# Patient Record
Sex: FEMALE | Race: WHITE
[De-identification: names, ages, dates, MRNs, and addresses within clinical notes are randomized per-mention and may not be internally consistent; named-entity substitution may affect disease eponyms.]

---

## 2019-04-04 ENCOUNTER — HOSPITAL ENCOUNTER (EMERGENCY)
Dept: HOSPITAL 10 - E/R | Age: 54
Discharge: HOME | End: 2019-04-04
Payer: MEDICAID

## 2019-04-04 ENCOUNTER — HOSPITAL ENCOUNTER (EMERGENCY)
Dept: HOSPITAL 91 - E/R | Age: 54
Discharge: HOME | End: 2019-04-04
Payer: MEDICAID

## 2019-04-04 VITALS
BODY MASS INDEX: 31.64 KG/M2 | WEIGHT: 178.57 LBS | HEIGHT: 63 IN | WEIGHT: 178.57 LBS | HEIGHT: 63 IN | BODY MASS INDEX: 31.64 KG/M2

## 2019-04-04 VITALS — RESPIRATION RATE: 18 BRPM | SYSTOLIC BLOOD PRESSURE: 155 MMHG | DIASTOLIC BLOOD PRESSURE: 79 MMHG | HEART RATE: 70 BPM

## 2019-04-04 DIAGNOSIS — R51: Primary | ICD-10-CM

## 2019-04-04 DIAGNOSIS — R11.2: ICD-10-CM

## 2019-04-04 DIAGNOSIS — E11.9: ICD-10-CM

## 2019-04-04 DIAGNOSIS — J45.909: ICD-10-CM

## 2019-04-04 PROCEDURE — 36415 COLL VENOUS BLD VENIPUNCTURE: CPT

## 2019-04-04 PROCEDURE — 96375 TX/PRO/DX INJ NEW DRUG ADDON: CPT

## 2019-04-04 PROCEDURE — 99285 EMERGENCY DEPT VISIT HI MDM: CPT

## 2019-04-04 PROCEDURE — 70450 CT HEAD/BRAIN W/O DYE: CPT

## 2019-04-04 PROCEDURE — 96374 THER/PROPH/DIAG INJ IV PUSH: CPT

## 2019-04-04 PROCEDURE — 82962 GLUCOSE BLOOD TEST: CPT

## 2019-04-04 RX ADMIN — THIAMINE HYDROCHLORIDE 1 MLS/HR: 100 INJECTION, SOLUTION INTRAMUSCULAR; INTRAVENOUS at 17:18

## 2019-04-04 RX ADMIN — DIPHENHYDRAMINE HYDROCHLORIDE 1 MG: 50 INJECTION, SOLUTION INTRAMUSCULAR; INTRAVENOUS at 17:18

## 2019-04-04 RX ADMIN — ONDANSETRON HYDROCHLORIDE 1 MG: 2 INJECTION, SOLUTION INTRAMUSCULAR; INTRAVENOUS at 17:18

## 2019-04-04 RX ADMIN — KETOROLAC TROMETHAMINE 1 MG: 30 INJECTION, SOLUTION INTRAMUSCULAR at 17:18

## 2019-04-04 RX ADMIN — METOCLOPRAMIDE HYDROCHLORIDE 1 MG: 10 INJECTION, SOLUTION INTRAMUSCULAR; INTRAVENOUS at 17:18

## 2019-04-04 NOTE — ERD
ER Documentation


Chief Complaint


Chief Complaint





Migraine HA 10/10, N/V X 5 days.





HPI


Patient is a 53-year-old female with diabetes and migraine who presents with 


headache.  The symptoms of the headache started 3 weeks ago and the patient has 


a history of migraine headaches.  Over the past 5 days however it has been 


worse.  It was gradual in onset.  The patient has had decreased intake by mouth 


and vomiting.  The patient has had no treatment as of yet.  The patient has 


light sensitivity.  Upon review of old medical records this is the patient's 


first visit to the ER.  The primary doctor is Dr. Pierson.





ROS


All systems reviewed and are negative except as per history of present illness.





Medications


Home Meds


Active Scripts


Ibuprofen* (Motrin*) 600 Mg Tab, 600 MG PO Q6H PRN for PAIN AND OR ELEVATED 


TEMP, #30 TAB


   Prov:HELEN MITCHELL MD         19


Discontinued Reported Medications


[Ibuprofen]   No Conflict Check


   10/12/16


[Imitrex]   No Conflict Check


   10/12/16


[Lamisil]   No Conflict Check


   10/12/16


[Singulair]   No Conflict Check


   10/12/16


[Amitriotylene]   No Conflict Check


   10/12/16


[Loratadine]   No Conflict Check


   10/12/16


[Mometasone]   No Conflict Check


   10/12/16


[Ventolin]   No Conflict Check


   10/12/16


[Lipitor]   No Conflict Check


   10/12/16





Allergies


Allergies:  


Coded Allergies:  


     No Known Allergy (Unverified , 19)





PMhx/Soc


History of Surgery:  Yes ()


Anesthesia Reaction:  No


Hx Neurological Disorder:  Yes (MIGRAINES)


Hx Respiratory Disorders:  Yes (ASTHMA, SEASONAL ALLERGIES)


Hx Cardiac Disorders:  No


Hx Psychiatric Problems:  No


Hx Miscellaneous Medical Probl:  Yes (HIGH CHOLESTEROL)


Hx Alcohol Use:  No


Hx Substance Use:  No


Hx Tobacco Use:  No


Smoking Status:  Never smoker





FmHx


Family History:  diabetes





Physical Exam


Vitals





Vital Signs


  Date      Temp  Pulse  Resp  B/P (MAP)   Pulse Ox  O2          O2 Flow    FiO2


Time                                                 Delivery    Rate


    19           69    17      140/85       100  Room Air


     17:17                          (103)


    19  98.4     78    18      181/89        99


     15:39                          (119)





Physical Exam


Const:   Moderate distress, unable to look at light and is covering her eyes


Head:   Atraumatic 


Eyes:    Normal Conjunctiva


ENT:    Normal External Ears, Nose and Mouth.


Neck:               Full range of motion. No meningismus.


Resp:   Clear to auscultation bilaterally


Cardio:   Regular rate and rhythm, no murmurs


Abd:    Soft, non tender, non distended. Normal bowel sounds


Skin:   No petechiae or rashes


Back:   No midline or flank tenderness


Ext:    No cyanosis, or edema


Neur:   Awake and alert, cranial nerves II through XII are intact, strength is 5


 out of 5 in all 4 extremities


Results 24 hrs





Laboratory Tests


                         Test
            19
15:46


                         Bedside Glucose    231 mg/dL





Current Medications


 Medications
   Dose
          Sig/Dylon
       Start Time
   Status  Last


 (Trade)       Ordered        Route
 PRN     Stop Time              Admin
Dose


                              Reason                                Admin


 Sodium         1,000 ml @ 
   Q1H STAT
      19        DC            19


Chloride       1,000 mls/hr   IV
            16:50
 19                17:18



                                             17:49


                10 mg          ONCE  STAT
    19        DC            19


Metoclopramid                 IV
            16:50
 19                17:18



e HCl
                                       16:52


(Reglan)


 Ondansetron    4 mg           ONCE  STAT
    19        DC            19


HCl
  (Zofran                 IV
            16:50
 19                17:18



Inj)                                         16:52


                25 mg          ONCE  STAT
    19        DC            19


Diphenhydrami                 IV
            16:50
 19                17:18



ne
 HCl
                                     16:52


(Benadryl)


 Ketorolac
     30 mg          ONCE  STAT
    19        DC            19


Tromethamine
                 IV
            16:50
 19                17:18



 (Toradol)                                   16:52








Procedures/MDM


CT brain negative per radiology.





Patient is a 53-year-old female presents with headache.  The patient had acute 


headache which has been gradual in onset but is lasted for 3 weeks now.  I was 


concerned about the length of time that this is lasted which is why the CT head 


was done.  CT head was negative for bleed or mass.  The patient was given 


Reglan, Zofran, and Benadryl as well as 1 L of normal saline and her pain is 


gone away and she feels much better.  The patient will be discharged but will 


need to follow-up with the primary doctor within 1 week.  She will be given 


ibuprofen for pain she.  She can return for any worsening symptoms.





Departure


Diagnosis:  


   Primary Impression:  


   Headache


   Headache type:  unspecified  Headache chronicity pattern:  acute headache  


   Intractability:  not intractable  Qualified Codes:  R51 - Headache


Condition:  Fair


Patient Instructions:  Self-Care for Headaches





Additional Instructions:  


Llame al doctor nomgil kong (Referral Sources) MAANA y teressa dion LANDY PARA 


DENTRO DE DION SEMANA.


Dgale a la secretaria que nosotros le instruimos hacer esta landy.Avise o llame 


si del rio condicin se empeora antes de la landy.











HELEN MITCHELL MD                 2019 19:04